# Patient Record
Sex: MALE | URBAN - METROPOLITAN AREA
[De-identification: names, ages, dates, MRNs, and addresses within clinical notes are randomized per-mention and may not be internally consistent; named-entity substitution may affect disease eponyms.]

---

## 2024-06-14 ENCOUNTER — HOSPITAL ENCOUNTER (OUTPATIENT)
Dept: RADIOLOGY | Facility: EXTERNAL LOCATION | Age: 19
Discharge: HOME | End: 2024-06-14

## 2024-06-14 DIAGNOSIS — M79.642 LEFT HAND PAIN: ICD-10-CM

## 2024-07-01 ENCOUNTER — APPOINTMENT (OUTPATIENT)
Dept: ORTHOPEDIC SURGERY | Facility: CLINIC | Age: 19
End: 2024-07-01
Payer: COMMERCIAL

## 2024-07-01 DIAGNOSIS — S63.682A OTHER SPRAIN OF LEFT THUMB, INITIAL ENCOUNTER: Primary | ICD-10-CM

## 2024-07-01 PROCEDURE — 99203 OFFICE O/P NEW LOW 30 MIN: CPT | Performed by: ORTHOPAEDIC SURGERY

## 2024-07-01 NOTE — PROGRESS NOTES
Subjective    Patient ID: Christoph Hayes is a 19 y.o. male.    Chief Complaint: Pain of the Left Hand (DOI: 6/13/2024)     Last Surgery: No surgery found  Last Surgery Date: No surgery found    HPI  Patient is a 19-year-old right-hand-dominant male who comes in with a complaint of left thumb pain.  He was sustained an injury to his left hand on June 14, 2024 when he was helping someone move a basketball hoop.  He states that the backboard itself fell onto his left hand.  Since then he has had pain near the thumb at the level of the MCP joint.  He was seen at urgent care.  X-rays were negative for fracture.  He has been wearing a thumb spica brace.    Objective   Ortho Exam  Patient is in no acute distress.  Exam of his left hand and wrist reveals a skin envelope is intact.  He has no tenderness at the thumb MCP joint.  There is no laxity with stress testing of the [facility].  He has appropriate function of his EPL and FPL.    Image Results:  Urgent Care Xray  Narrative: Interpreted By:  Lamar Malloy,   STUDY:  [facility] 6/14/2024 8:14 am      INDICATION:  Signs/Symptoms:left hand pain      COMPARISON:  None available.      ACCESSION NUMBER(S):  [id number]      ORDERING CLINICIAN:  SALUD AGUILAR      TECHNIQUE:  Three views of the left hand      FINDINGS:  No fracture, dislocation or bony abnormality with the regional soft  tissues unremarkable.      Impression: Negative exam.      Signed by: Lamar Malloy 6/14/2024 8:28 AM  Dictation workstation:   [id number]      Assessment/Plan   Encounter Diagnoses:  Other sprain of left thumb, initial encounter    Patient likely sustained a sprain of the thumb MCP joint.  At this point it has been almost 3 weeks.  I informed the patient he does not have to wear the brace anymore.  He may slowly return to his activities as tolerated.  He will follow-up as his symptoms dictate.